# Patient Record
Sex: MALE | Race: WHITE | NOT HISPANIC OR LATINO | Employment: STUDENT | URBAN - METROPOLITAN AREA
[De-identification: names, ages, dates, MRNs, and addresses within clinical notes are randomized per-mention and may not be internally consistent; named-entity substitution may affect disease eponyms.]

---

## 2024-05-20 ENCOUNTER — APPOINTMENT (OUTPATIENT)
Dept: AUDIOLOGY | Facility: CLINIC | Age: 24
End: 2024-05-20
Payer: COMMERCIAL

## 2024-05-20 ENCOUNTER — APPOINTMENT (OUTPATIENT)
Dept: OTOLARYNGOLOGY | Facility: CLINIC | Age: 24
End: 2024-05-20
Payer: COMMERCIAL

## 2024-05-28 ENCOUNTER — CLINICAL SUPPORT (OUTPATIENT)
Dept: AUDIOLOGY | Facility: CLINIC | Age: 24
End: 2024-05-28
Payer: COMMERCIAL

## 2024-05-28 ENCOUNTER — OFFICE VISIT (OUTPATIENT)
Dept: OTOLARYNGOLOGY | Facility: CLINIC | Age: 24
End: 2024-05-28
Payer: COMMERCIAL

## 2024-05-28 VITALS
HEIGHT: 74 IN | TEMPERATURE: 98.6 F | BODY MASS INDEX: 27.08 KG/M2 | DIASTOLIC BLOOD PRESSURE: 85 MMHG | WEIGHT: 211 LBS | SYSTOLIC BLOOD PRESSURE: 126 MMHG

## 2024-05-28 DIAGNOSIS — H92.02 OTALGIA OF LEFT EAR: Primary | ICD-10-CM

## 2024-05-28 DIAGNOSIS — R29.898 NECK TIGHTNESS: Primary | ICD-10-CM

## 2024-05-28 DIAGNOSIS — R51.9 PAIN OF POSTAURICULAR REGION: ICD-10-CM

## 2024-05-28 DIAGNOSIS — H92.02 LEFT EAR PAIN: ICD-10-CM

## 2024-05-28 DIAGNOSIS — H93.8X2 EAR FULLNESS, LEFT: ICD-10-CM

## 2024-05-28 DIAGNOSIS — H93.13 TINNITUS OF BOTH EARS: ICD-10-CM

## 2024-05-28 DIAGNOSIS — M26.609 TMJ DYSFUNCTION: ICD-10-CM

## 2024-05-28 DIAGNOSIS — R42 DYSEQUILIBRIUM: ICD-10-CM

## 2024-05-28 PROCEDURE — 92557 COMPREHENSIVE HEARING TEST: CPT | Performed by: AUDIOLOGIST

## 2024-05-28 PROCEDURE — 92550 TYMPANOMETRY & REFLEX THRESH: CPT | Performed by: AUDIOLOGIST

## 2024-05-28 RX ORDER — LORAZEPAM 0.5 MG/1
0.5 TABLET ORAL
COMMUNITY
Start: 2024-05-14

## 2024-05-28 RX ORDER — DOXAZOSIN 1 MG/1
1 TABLET ORAL NIGHTLY
COMMUNITY

## 2024-05-28 ASSESSMENT — ENCOUNTER SYMPTOMS
NECK PAIN: 1
HEADACHES: 1
OCCASIONAL FEELINGS OF UNSTEADINESS: 0
DIARRHEA: 0
RHINORRHEA: 0
ABDOMINAL PAIN: 0
COUGH: 0

## 2024-05-28 ASSESSMENT — PATIENT HEALTH QUESTIONNAIRE - PHQ9
SUM OF ALL RESPONSES TO PHQ9 QUESTIONS 1 AND 2: 0
2. FEELING DOWN, DEPRESSED OR HOPELESS: NOT AT ALL
1. LITTLE INTEREST OR PLEASURE IN DOING THINGS: NOT AT ALL

## 2024-05-28 ASSESSMENT — PAIN SCALES - GENERAL: PAINLEVEL_OUTOF10: 4

## 2024-05-28 NOTE — PROGRESS NOTES
AUDIOLOGY ADULT AUDIOMETRIC EVALUATION      Name:  Chevy Henao  :  2000  Age:  24 y.o.  Date of Evaluation: 24    History:  Reason for visit:  Mr. Henao was seen today as part of the visit with BARBARA Gaytan for an evaluation of hearing.   Chief Complaint   Patient presents with    Tinnitus    Earache     Stated for approximately the past year he has experienced a sensation of left sided otalgia. Reported the pain may been deep inside the ear, but also occurs near the jaw joint area. Stated he has noticed when he clenches his jaw, he will experience an increase in his pain. Current otalgia rated as a 4/10 on the pain scale.   Mentioned last year he experienced a bad fall and hit the left side of his head/neck area and was unsure if that created any problems. He did reported a CSF leak following his back surgery and mentioned he has noticed some slight problems with new headaches.   Stated he has been able to hear and communicate well, however, he has noticed at times on the left ear that sound may have a slight reverberation quality.   Reported a constant bilaterally non-pulsatile high pitched ringing tinnitus. Mentioned at times the tinnitus appears to interfere with his sleep.   Significant history of noise exposure while serving in the [employer] and working around louder construction sounds.   Denied any dizziness/vertigo, ear surgery, recent ear/sinus infections, recent falls, sinus/throat concerns, ear drainage, or sudden hearing loss.    EVALUATION     See Audiogram    RESULTS:    Otoscopic Evaluation:   Right Ear: Otoscopy revealed a clear healthy canal and a healthy tympanic membrane was visualized.   Left Ear:  Otoscopy revealed a clear healthy canal and a healthy tympanic membrane was visualized.     Immittance:  Immittance Measures: 226 Hz   Right Ear: Tympanometric testing revealed a normal type A tympanogram with normal middle ear pressure and normal static  compliance.  Left Ear: Tympanometric testing revealed a normal type A tympanogram with normal middle ear pressure and normal static compliance.    Ipsilateral acoustic reflexes were present at, 500-4,000 Hz, at expected sensation levels.  Ipsilateral acoustic reflexes were present at, 500-4,000 Hz, at expected sensation levels.    Test technique:  Pure Tone Audiometry via TDH Headphones     Reliability:   excellent    Pure Tone Audiometry:    Right Ear: Audiometric testing indicated normal peripheral hearing sensitivity from 125-8,000 Hz.  Left Ear:   Audiometric testing indicated normal peripheral hearing sensitivity from 125-8,000 Hz.      Speech Audiometry:   Right Ear:  Speech Reception Threshold (SRT) was obtained at 5 dBHL                 Word Recognition scores were excellent (100%) in quiet when words were presented at 45 dBHL  Left Ear:  Speech Reception Threshold (SRT) was obtained at 5 dBHL                 Word Recognition scores were excellent (100%) in quiet when words were presented at 45 dBHL  Testing was performed with recorded NU-6 speech words in quiet. Speech thresholds were in good agreement with the pure tone averages in each ear.     Distortion Product Otoacoustic Emissions:  Right Ear: Distortion product otoacoustic emissions were present and robust from 1,000-8,000 Hz, indicating normal to near normal cochlear outer hair cell function at these frequencies.  Left Ear:  Distortion product otoacoustic emissions were present and robust from 1,500-8,000 Hz, indicating normal to near normal cochlear outer hair cell function at these frequencies.  Present OAEs suggest normal cochlear outer hair cell function.  Absent OAEs are consistent with some degree of hearing loss.    IMPRESSIONS:  Today's test results are consistent with normal peripheral hearing sensitivity, bilaterally.  The patient was counseled with regard to the findings.    RECOMMENDATIONS:  * Continue medical follow up with Enedelia  Padma, APRN-CNP   * Retest as medically indicated, or sooner if a change in hearing sensitivity or tinnitus is noticed.   * Wear hearing protection while in the presence of loud sounds.   * Use tinnitus coping strategies as needed, such as sound apps on a smart phone, utilizing calming noise in the room, running a fan at night, etc.     PATIENT EDUCATION:   Discussed results and recommendations with the patient and a copy of the hearing test was provided.  Questions were addressed and the patient was encouraged to contact our department should concerns arise.  The patient was seen from  9:20-9:32 am.

## 2024-05-28 NOTE — PROGRESS NOTES
Patient ID: Chevy Henao is a 24 y.o. male who presents for the evaluation of left ear pain.     PROVIDER IMPRESSIONS:  DIAGNOSES/PROBLEMS:  -TMJ dysfunction  -Neck tightness  -Tinnitus, bilateral  -Left ear pain  -Pain of postauricular region, left  -Dysequilibrium  -Left ear fullness    ASSESSMENT:   Chevy Henao is a pleasant 24 y.o. male who presents with symptoms of intermittent left otalgia, left aural fullness, bilateral non-pulsatile tinnitus, continuous left postauricular pain, chronic neck tension/tightness, and episodic non-vertiginous non-positional dizziness/dysequilibrium. Based on the clinical information provided, symptoms and clinical exam findings are consistent with TMJ dysfunction and cervicogenic/postural related conditions. Reassurance provided to patient that exam today showed no evidence of acute infection or inflammation in the EAC bilaterally and that TM appears with no evidence of infection, effusion, retraction or perforation bilaterally. Audiogram reviewed in detail with the patient, which revealed normal hearing function results throughout. The etiology of temporomandibular joint disorders (TMD) was discussed in detail with patient including: structural issues such as alterations in dental occlusion (the alignment of the upper and lower teeth) that affect maxillomandibular position and function. These issues may or may not be associated with joint trauma, poor posture or cervicogenic etiologies. Possible psychologic factors include anxiety, depression and PTSD. Multiple other contributing and comorbid factors, including biological, behavioral, environmental, and cognitive disorders which can all contribute to the development of symptoms were also reviewed with the patient.    PLAN:  I recommended implementing the following lifestyle strategies for TMJ symptom management: Initiate a soft diet for the next 2-3 weeks and avoid eating chewy/tough foods such as gum, raw fruits/vegetables, tough  meats, or anything else that requires significant mastication. Examples of appropriate soft foods include mashed potatoes, soft pasta, macaroni, yogurt, ice cream, and other things may easily be mashed with a fork. Perform temple and cheekbone massages twice daily for several minutes by using your knuckles to gently massage in circles near temples and along your cheekbones as tolerated. Apply a warm/cold compress along the entire side of the affected face, directly over TMJ structures, once or twice daily for 20 minutes at a time and remove sooner if skin irritation occurs. Consider purchase of a custom nighttime mouth guard from a dentist to prevent jaw clenching and/or teeth grinding while asleep. If facial pain is present, may use o.t.c.  ibuprofen 600 mg three times a day for three days only with meals, advised to monitor for side effects of upset stomach and ulcers if ibuprofen is taken on an empty stomach and advised to avoid NSAIDs if previously deemed as contraindicated.   I recommended referral to TMJ physical therapy for further evaluation and management of TMD and neck tightness/tension. Referral e-submitted and patient instructed to schedule.  Follow-up: Patient may schedule for follow-up as needed. Patient is agreeable to this plan, all questions were answered to patient's satisfaction.     Subjective   HPI: Chevy Henao is a 24 y.o. male who presents for the evaluation of left ear pain.  The patient states that symptom onset began 1 year ago after traumatic fall on the left side of the face. Describes left ear pain as an intermittent dull/throbbing sensation deep in the left ear that he rates a 4/10 during episodes and radiates to left postauricular space and down the left jaw line. Reports that ear pain occurs a few times daily and is triggered with movement of head back and tilting jaw down. Reports that since the fall he has also experienced chronic left neck tension/tightness and continuous left  postauricular pain. When asked about the presence of hearing loss, ear pain, ear fullness/pressure, tinnitus, ear itching, ear drainage, autophony, dizziness or vertigo, he admits to left aural fullness and bilateral non-pulsatile tinnitus for the past 6 years following serving in the [employer]. Describes tinnitus as a continuous ringing sound in both ears that is non-bothersome. Reports history of nighttime teeth-grinding. When asked about pertinent otologic history, the patient denies a history of recurrent ear infections, denies history of ear surgery, denies history of PE tube insertion. 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 The patient does insert Q-tips in the ear canals 1-2 times per week, and  denies insertion of other foreign objects into the ear canals. The patient denies history of wearing hearing aid devices. The patient does not endorse a family history of hearing loss.    PATIENT HISTORY:  No past medical history on file.   No past surgical history on file.   Not on File   No current outpatient medications on file.   Tobacco Use: Low Risk  (5/6/2024)    Received from [facility]    Patient History     Smoking Tobacco Use: Never     Smokeless Tobacco Use: Never     Passive Exposure: Never      Alcohol Use: Not At Risk (6/28/2023)    Received from [facility]    AUDIT-C     Frequency of Alcohol Consumption: Monthly or less     Average Number of Drinks: 1 or 2     Frequency of Binge Drinking: Never      Social History     Substance and Sexual Activity   Drug Use Not on file        Review of Systems   All other systems negative.     Objective   There were no vitals taken for this visit.     PHYSICAL EXAM:  General appearance: Appears well, well-nourished, well groomed. No acute distress.   Constitutional: No fever, chills, weight loss or weight  gain.  Communication: Normal communication  Psychiatric: Oriented to person, place and time. Normal mood and affect.  Neurologic: Cranial nerves II-XII grossly intact and symmetric bilaterally.  Cardiovascular: Examination of peripheral vascular system shows no clubbing or cyanosis.  Respiratory: No respiratory distress increased work of breathing. Inspection of the chest with symmetric chest expansion and normal respiratory effort.  Skin: No head and neck rashes.  Head: Normocephalic. Atraumatic with no masses, lesions or scarring.  Face: Normal symmetry. No scars or deformities.  Eyes: Conjunctiva not edematous or erythematous. PERRLA  Neck: Supple and symmetric, trachea midline. Lymph nodes with no adenopathy.  Head: Normocephalic. Atraumatic with no masses, lesions or scarring.  Eyes: PERRL, EOMI, Conjunctiva is clear. No nystagmus.  Nose: External inspection of nose: No nasal lesions, lacerations or scars. No tenderness on frontal or maxillary sinus palpation. Anterior rhinoscopy with limited visualization past the inferior turbinates: Septum is deviated left.  No septal perforation or lesions. No septal hematoma/ seroma.  No signs of bleeding.  No evidence of intranasal polyps.  Inferior turbinates are surgically reduced.    Throat:  Floor of mouth is clear, no masses.  Tongue appears normal, no lesions or masses. Gums, gingiva, buccal mucosa appear pink and moist, no lesions. Teeth are in intact.  No obvious dental infections.  Peritonsillar regions appear symmetric without swelling.  Hard and soft palate appear normal, no obvious cleft. Uvula is midline.  Left Tonsil -- 2+, no exudates.  Right Tonsil -- 2+, no exudates.  Oropharynx: No lesions. Retropharyngeal wall is flat.  No postnasal drip.  Salivary Glands: Symmetric bilaterally.  No palpable masses.  No evidence of acute infection or salivary stones.  TMJ: Mild TMJ crepitus (left>right) on jaw opening with bimanual palpation, no trismus.  Right Ear:  External inspection of ear with no deformity, scars, or masses. Mastoid is nontender. External auditory canal is clear.  TM is intact with no sign of infection, effusion, or retraction.  No perforation seen. Auto insufflation visible under microscopy.  Left Ear: External inspection of ear with no deformity, scars, or masses. Mastoid is nontender. External auditory canal is clear with 1 small/loose piece of hair debris which was removed using alligator forceps.  TM is intact with no sign of infection, effusion, or retraction.  No perforation seen. Auto insufflation visible under microscopy.    RESULTS:  I personally reviewed the patient's audiogram today from 5/28/24, which showed: Normal hearing across all frequencies in bilateral ears. Normal tympanogram bilaterally. Excellent word discrimination bilaterally. Acoustic reflexes present right ipsilateral, and left ipsilateral. DPOAEs present and robust bilaterally from 6279-1125 Hz.     Enedelia Rouse, APRN-CNP